# Patient Record
Sex: MALE | NOT HISPANIC OR LATINO
[De-identification: names, ages, dates, MRNs, and addresses within clinical notes are randomized per-mention and may not be internally consistent; named-entity substitution may affect disease eponyms.]

---

## 2023-03-08 PROBLEM — Z00.00 ENCOUNTER FOR PREVENTIVE HEALTH EXAMINATION: Status: ACTIVE | Noted: 2023-03-08

## 2023-03-09 ENCOUNTER — APPOINTMENT (OUTPATIENT)
Dept: PULMONOLOGY | Facility: CLINIC | Age: 33
End: 2023-03-09
Payer: COMMERCIAL

## 2023-03-09 VITALS
SYSTOLIC BLOOD PRESSURE: 129 MMHG | OXYGEN SATURATION: 98 % | HEART RATE: 101 BPM | HEIGHT: 72 IN | DIASTOLIC BLOOD PRESSURE: 77 MMHG | WEIGHT: 220 LBS | BODY MASS INDEX: 29.8 KG/M2 | TEMPERATURE: 98.4 F

## 2023-03-09 DIAGNOSIS — R06.83 SNORING: ICD-10-CM

## 2023-03-09 DIAGNOSIS — G47.10 HYPERSOMNIA, UNSPECIFIED: ICD-10-CM

## 2023-03-09 DIAGNOSIS — R06.81 APNEA, NOT ELSEWHERE CLASSIFIED: ICD-10-CM

## 2023-03-09 PROCEDURE — 99204 OFFICE O/P NEW MOD 45 MIN: CPT

## 2023-03-09 NOTE — PHYSICAL EXAM
[General Appearance - In No Acute Distress] : no acute distress [Micrognathia] : micrognathia [IV] : IV [Neck Appearance] : the appearance of the neck was normal [Neck Cervical Mass (___cm)] : no neck mass was observed [Apical Impulse] : the apical impulse was normal [Heart Sounds] : normal S1 and S2 [] : no respiratory distress [Exaggerated Use Of Accessory Muscles For Inspiration] : no accessory muscle use [Abnormal Walk] : normal gait [Involuntary Movements] : no involuntary movements were seen [No Focal Deficits] : no focal deficits [Oriented To Time, Place, And Person] : oriented to person, place, and time [Affect] : the affect was normal

## 2023-03-09 NOTE — REVIEW OF SYSTEMS
[Fatigue] : fatigue [Recent Wt Gain (___ Lbs)] : recent [unfilled] ~Ulb weight gain [Snoring] : snoring [Witnessed Apneas] : witnessed apnea [Obesity] : obesity [Negative] : Psychiatric

## 2023-03-12 NOTE — ASSESSMENT
[FreeTextEntry1] : 32 y/o M with c/o snoring and apnea who is here for initial evaluation for possible sleep apnea.  At definite risk for obstructive sleep apnea, he also seems to be significantly sleep deprived, which is probably at least part of the etiology of his daytime sleepiness.

## 2023-03-12 NOTE — HISTORY OF PRESENT ILLNESS
[FreeTextEntry1] : 03/09/2023 :  SKYE HERRERA is a 33 year old male with PMHx controlled  asthma who is here for initial evaluation os possible sleep apnea. \par \par He had tonsillectomy and adenectomy at the age of 17 due to frequent infections. About a year ago he was told by his girlfriend about snoring and witnessed apnea. He denies sleep study in the past. \par \par \par He gets sleepy easily and dozes off during a day and gained about 10lb x 1 month. He works at [employer] and his commute is about 2 hours each day. \par \par \par Sleep Routine:\par \par He goes to bed at 12A, sleep latency is about 5 min, he wakes up once, WASO is about 2 min and then he is up at 5A. He does not nap . \par \par \par He denies cataplexy, RLS, parasomnia, or any other sleep behavioral issues. \par \par \par

## 2023-04-13 ENCOUNTER — APPOINTMENT (OUTPATIENT)
Dept: SLEEP CENTER | Facility: HOME HEALTH | Age: 33
End: 2023-04-13